# Patient Record
Sex: FEMALE | Race: ASIAN | Employment: UNEMPLOYED | URBAN - METROPOLITAN AREA
[De-identification: names, ages, dates, MRNs, and addresses within clinical notes are randomized per-mention and may not be internally consistent; named-entity substitution may affect disease eponyms.]

---
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
## 2023-03-11 PROBLEM — R55 VASOVAGAL SYNCOPE: Status: ACTIVE | Noted: 2023-03-11

## 2023-03-31 ENCOUNTER — APPOINTMENT (OUTPATIENT)
Dept: PRIMARY CARE | Facility: CLINIC | Age: 23
End: 2023-03-31
Payer: COMMERCIAL

## 2023-04-06 ENCOUNTER — OFFICE VISIT (OUTPATIENT)
Dept: PRIMARY CARE | Facility: CLINIC | Age: 23
End: 2023-04-06
Payer: COMMERCIAL

## 2023-04-06 VITALS
HEART RATE: 74 BPM | TEMPERATURE: 98.1 F | DIASTOLIC BLOOD PRESSURE: 83 MMHG | OXYGEN SATURATION: 98 % | BODY MASS INDEX: 33.78 KG/M2 | SYSTOLIC BLOOD PRESSURE: 118 MMHG | HEIGHT: 66 IN | WEIGHT: 210.2 LBS

## 2023-04-06 DIAGNOSIS — L20.82 FLEXURAL ECZEMA: ICD-10-CM

## 2023-04-06 DIAGNOSIS — Z00.00 HEALTH CARE MAINTENANCE: Primary | ICD-10-CM

## 2023-04-06 PROCEDURE — 99395 PREV VISIT EST AGE 18-39: CPT | Performed by: STUDENT IN AN ORGANIZED HEALTH CARE EDUCATION/TRAINING PROGRAM

## 2023-04-06 PROCEDURE — 99214 OFFICE O/P EST MOD 30 MIN: CPT | Performed by: STUDENT IN AN ORGANIZED HEALTH CARE EDUCATION/TRAINING PROGRAM

## 2023-04-06 PROCEDURE — 88141 CYTOPATH C/V INTERPRET: CPT | Performed by: PATHOLOGY

## 2023-04-06 PROCEDURE — 88175 CYTOPATH C/V AUTO FLUID REDO: CPT

## 2023-04-06 PROCEDURE — 1036F TOBACCO NON-USER: CPT | Performed by: STUDENT IN AN ORGANIZED HEALTH CARE EDUCATION/TRAINING PROGRAM

## 2023-04-06 RX ORDER — HYDROCORTISONE 25 MG/G
OINTMENT TOPICAL 2 TIMES DAILY PRN
Qty: 30 G | Refills: 2 | Status: SHIPPED | OUTPATIENT
Start: 2023-04-06 | End: 2023-11-04

## 2023-04-06 ASSESSMENT — PROMIS GLOBAL HEALTH SCALE
RATE_QUALITY_OF_LIFE: GOOD
RATE_SOCIAL_SATISFACTION: GOOD
RATE_MENTAL_HEALTH: GOOD
EMOTIONAL_PROBLEMS: SOMETIMES
RATE_AVERAGE_PAIN: 0
RATE_GENERAL_HEALTH: GOOD
RATE_PHYSICAL_HEALTH: GOOD
RATE_AVERAGE_FATIGUE: MODERATE
CARRYOUT_PHYSICAL_ACTIVITIES: MOSTLY
CARRYOUT_SOCIAL_ACTIVITIES: VERY GOOD

## 2023-04-06 ASSESSMENT — PAIN SCALES - GENERAL: PAINLEVEL: 0-NO PAIN

## 2023-04-06 NOTE — PROGRESS NOTES
"  Subjective   Patient ID: April is a 23 y.o. female who presents for Annual Exam (Pap).    PMH: Denies   PSH: Denies   Fam Hx: Denies family of cancers   Medications: None   Allergies: None   Social Hx: Non smoker, denies etoh or illicit drug use   OBGYN Hx:   -LMP: Last week  Not regular periods  For the last 5 months havent been regular and has spotting now and then  Has been spotting every few weeks and then 2 weeks later was having period  Wore a panty liner for spotting   Just had a full period last   Prior to the 5 months she was usually having regular periods  Denies any abdominal pain   Not on any contraception   -Last Pap: Never had a pap   -Hx of STIS: Denies   Sexual Hx: No sexual activity within the last year   SH: Hasnt been stressed in last few months  Worried that she has pcos maybe due to weight gain   Hasnt noticed any excessive hair growth.  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   - Patient's rating of their own health: good   - Dental Care: last prior dental visit - sees the dentist once a year in [place]   - Vision: last prior ophtho visit : wears glasses and had vision checked   - Hearing: denies recent hearing loss - No concerns   - Diet: making an attempt to eat healthier   Eats more vegetables and more salads and fruits   - Exercise: does not exercise    - Weight: concerned about gaining weight     Review of Systems  ROS negative except for above mentioned     Objective   Vitals: /83 (BP Location: Left arm, Patient Position: Sitting, BP Cuff Size: Adult)   Pulse 74   Temp 36.7 °C (98.1 °F) (Temporal)   Ht 1.676 m (5' 6\")   Wt 95.3 kg (210 lb 3.2 oz)   SpO2 98%   BMI 33.93 kg/m²    Physical Exam    General: Well developed, well nourished, alert and cooperative, appears to be in no acute distress.   HEENT: Normocephalic, atraumatic. EOMI. No nasal discharge. Neck supple and symmetric.  Cardiac: Normal S1 " and S2. No S3, S4, or murmurs. Rhythm is regular. No peripheral edema, cyanosis, or pallor. Extremities warm and well perfused.   Lungs: Clear to auscultation bilaterally. No rales, rhonchi, wheezing, diminished breath sounds.   Abdomen: Bowel sounds x4. Soft, nondistended, nontender. No guarding, rebound, or masses.   : External genitalia without erythema, exudate or discharge. Vaginal vault is without discharge. Cervix is of normal color without lesion. The os is closed. There is no bleeding noted.   MSK: Adequately aligned spine. ROM intact spine and extremities. No joint erythema or tenderness. No edema. Peripheral pulses intact.   Neuro: CN II-XII grossly intact. Strength and sensation symmetric and intact throughout..   Skin: Skin rash consistent with eczema noted on bilateral upper extremities   Psych: Oriented to person, place, and time. Demonstrates good judgement and reason.     Assessment/Plan   A 23 y o F with no sig pmh presents to establish care with new physician and for a health maintenance visit.   # Routine Health Maintenance  Immunization History   Administered Date(s) Administered    DTaP 2001, 2001, 2001, 04/02/2002    Hep B, Adolescent or Pediatric 2001, 2001, 04/02/2002, 07/24/2019    Hep B, adult 02/23/2021    HiB, unspecified 2001, 2001, 04/02/2002    IPV 2001, 2001, 07/18/2002    Influenza, seasonal, injectable 10/15/2021    MMR 01/31/2002    Meningococcal MCV4P 07/18/2019    Meningococcal, Unknown Serogroups 05/31/2018    Moderna SARS-CoV-2 Vaccination 01/30/2021, 02/28/2021, 11/05/2021    Pneumococcal, Unspecified 2001, 2001, 2001, 04/02/2002    Tdap 07/19/2013    Varicella 01/31/2002, 07/24/2019      - Flu vaccine: recommended annually,   - COVID vaccine: recommended completion of primary series and recommended boosters,   - Tdap: endorses having one within the last 10 years   - Lifetime HIV, syph, HepC: Denies  screening today   - Lipid Panel (35M,45F): not indicated  - DM screening: not indicated   - HTN screening: wnl today  - Depression: PHQ-2 negative  - Last Dental: recommended follow up,   - Last Eye exam: recommended follow up,   - pap smear(21-65): performed today     #Abnormal uterine bleeding  Encouraged patient to start menstrual diary and bring to next visit    #Eczema  Start Hydrocortisone daily       Problem List Items Addressed This Visit    None      Attending Supervision: Patient seen and discussed with attending physician (cosigner listed on this note).    RTC in 3 months, or earlier as needed.    Nnamdi De Luna MD  Family Medicine, PGY-2

## 2023-04-07 ENCOUNTER — APPOINTMENT (OUTPATIENT)
Dept: LAB | Facility: LAB | Age: 23
End: 2023-04-07
Payer: COMMERCIAL

## 2023-04-09 LAB
NIL(NEG) CONTROL SPOT COUNT: NORMAL
PANEL A SPOT COUNT: 0
PANEL B SPOT COUNT: 0
POS CONTROL SPOT COUNT: NORMAL
T-SPOT. TB INTERPRETATION: NEGATIVE

## 2023-04-10 NOTE — PROGRESS NOTES
I saw and evaluated the patient. I personally obtained the key and critical portions of the history and physical exam or was physically present for key and critical portions performed by the resident/fellow. I reviewed the resident/fellow's documentation and discussed the patient with the resident/fellow. I agree with the resident/fellow's medical decision making as documented in the note.    Jelani Price MD

## 2023-04-12 LAB
COMPLETE PATHOLOGY REPORT: NORMAL
CONVERTED CLINICAL DIAGNOSIS-HISTORY: NORMAL
CONVERTED DIAGNOSIS COMMENT: NORMAL
CONVERTED FINAL DIAGNOSIS: NORMAL
CONVERTED FINAL REPORT PDF LINK TO COPY AND PASTE: NORMAL

## 2023-08-18 ENCOUNTER — APPOINTMENT (OUTPATIENT)
Dept: LAB | Facility: LAB | Age: 23
End: 2023-08-18
Payer: COMMERCIAL

## 2023-11-01 ENCOUNTER — APPOINTMENT (OUTPATIENT)
Dept: PRIMARY CARE | Facility: CLINIC | Age: 23
End: 2023-11-01
Payer: COMMERCIAL

## 2023-11-01 ASSESSMENT — LIFESTYLE VARIABLES: HISTORY_OF_SMOKING: I HAVE NEVER SMOKED

## 2023-11-02 ENCOUNTER — APPOINTMENT (OUTPATIENT)
Dept: RADIOLOGY | Facility: HOSPITAL | Age: 23
End: 2023-11-02
Payer: COMMERCIAL

## 2023-11-02 ENCOUNTER — HOSPITAL ENCOUNTER (EMERGENCY)
Facility: HOSPITAL | Age: 23
Discharge: HOME | End: 2023-11-02
Payer: COMMERCIAL

## 2023-11-02 VITALS
DIASTOLIC BLOOD PRESSURE: 84 MMHG | TEMPERATURE: 97.4 F | RESPIRATION RATE: 16 BRPM | OXYGEN SATURATION: 99 % | SYSTOLIC BLOOD PRESSURE: 128 MMHG | HEART RATE: 67 BPM

## 2023-11-02 DIAGNOSIS — R51.9 ACUTE NONINTRACTABLE HEADACHE, UNSPECIFIED HEADACHE TYPE: Primary | ICD-10-CM

## 2023-11-02 LAB
ALBUMIN SERPL BCP-MCNC: 4.6 G/DL (ref 3.4–5)
ALP SERPL-CCNC: 53 U/L (ref 33–110)
ALT SERPL W P-5'-P-CCNC: 14 U/L (ref 7–45)
ANION GAP SERPL CALC-SCNC: 14 MMOL/L (ref 10–20)
APPEARANCE UR: CLEAR
AST SERPL W P-5'-P-CCNC: 22 U/L (ref 9–39)
BASOPHILS # BLD AUTO: 0.01 X10*3/UL (ref 0–0.1)
BASOPHILS NFR BLD AUTO: 0.2 %
BILIRUB SERPL-MCNC: 0.5 MG/DL (ref 0–1.2)
BILIRUB UR STRIP.AUTO-MCNC: NEGATIVE MG/DL
BUN SERPL-MCNC: 4 MG/DL (ref 6–23)
CALCIUM SERPL-MCNC: 9.8 MG/DL (ref 8.6–10.6)
CHLORIDE SERPL-SCNC: 106 MMOL/L (ref 98–107)
CO2 SERPL-SCNC: 23 MMOL/L (ref 21–32)
COLOR UR: YELLOW
CREAT SERPL-MCNC: 0.64 MG/DL (ref 0.5–1.05)
EOSINOPHIL # BLD AUTO: 0.01 X10*3/UL (ref 0–0.7)
EOSINOPHIL NFR BLD AUTO: 0.2 %
ERYTHROCYTE [DISTWIDTH] IN BLOOD BY AUTOMATED COUNT: 12 % (ref 11.5–14.5)
GFR SERPL CREATININE-BSD FRML MDRD: >90 ML/MIN/1.73M*2
GLUCOSE SERPL-MCNC: 84 MG/DL (ref 74–99)
GLUCOSE UR STRIP.AUTO-MCNC: NEGATIVE MG/DL
HCT VFR BLD AUTO: 40 % (ref 36–46)
HGB BLD-MCNC: 14.1 G/DL (ref 12–16)
HOLD SPECIMEN: NORMAL
IMM GRANULOCYTES # BLD AUTO: 0 X10*3/UL (ref 0–0.7)
IMM GRANULOCYTES NFR BLD AUTO: 0 % (ref 0–0.9)
KETONES UR STRIP.AUTO-MCNC: NEGATIVE MG/DL
LEUKOCYTE ESTERASE UR QL STRIP.AUTO: NEGATIVE
LYMPHOCYTES # BLD AUTO: 1.56 X10*3/UL (ref 1.2–4.8)
LYMPHOCYTES NFR BLD AUTO: 29.4 %
MAGNESIUM SERPL-MCNC: 2.05 MG/DL (ref 1.6–2.4)
MCH RBC QN AUTO: 29 PG (ref 26–34)
MCHC RBC AUTO-ENTMCNC: 35.3 G/DL (ref 32–36)
MCV RBC AUTO: 82 FL (ref 80–100)
MONOCYTES # BLD AUTO: 0.69 X10*3/UL (ref 0.1–1)
MONOCYTES NFR BLD AUTO: 13 %
NEUTROPHILS # BLD AUTO: 3.04 X10*3/UL (ref 1.2–7.7)
NEUTROPHILS NFR BLD AUTO: 57.2 %
NITRITE UR QL STRIP.AUTO: NEGATIVE
NRBC BLD-RTO: 0 /100 WBCS (ref 0–0)
PH UR STRIP.AUTO: 6 [PH]
PLATELET # BLD AUTO: 216 X10*3/UL (ref 150–450)
POTASSIUM SERPL-SCNC: 4.4 MMOL/L (ref 3.5–5.3)
PREGNANCY TEST URINE, POC: NEGATIVE
PROT SERPL-MCNC: 8 G/DL (ref 6.4–8.2)
PROT UR STRIP.AUTO-MCNC: NEGATIVE MG/DL
RBC # BLD AUTO: 4.86 X10*6/UL (ref 4–5.2)
RBC # UR STRIP.AUTO: NEGATIVE /UL
SODIUM SERPL-SCNC: 139 MMOL/L (ref 136–145)
SP GR UR STRIP.AUTO: 1.01
UROBILINOGEN UR STRIP.AUTO-MCNC: <2 MG/DL
WBC # BLD AUTO: 5.3 X10*3/UL (ref 4.4–11.3)

## 2023-11-02 PROCEDURE — 70450 CT HEAD/BRAIN W/O DYE: CPT

## 2023-11-02 PROCEDURE — 81025 URINE PREGNANCY TEST: CPT

## 2023-11-02 PROCEDURE — 81003 URINALYSIS AUTO W/O SCOPE: CPT

## 2023-11-02 PROCEDURE — 96374 THER/PROPH/DIAG INJ IV PUSH: CPT

## 2023-11-02 PROCEDURE — 85025 COMPLETE CBC W/AUTO DIFF WBC: CPT

## 2023-11-02 PROCEDURE — 70450 CT HEAD/BRAIN W/O DYE: CPT | Performed by: RADIOLOGY

## 2023-11-02 PROCEDURE — 80053 COMPREHEN METABOLIC PANEL: CPT

## 2023-11-02 PROCEDURE — 96375 TX/PRO/DX INJ NEW DRUG ADDON: CPT

## 2023-11-02 PROCEDURE — 2500000004 HC RX 250 GENERAL PHARMACY W/ HCPCS (ALT 636 FOR OP/ED)

## 2023-11-02 PROCEDURE — 96361 HYDRATE IV INFUSION ADD-ON: CPT

## 2023-11-02 PROCEDURE — 36415 COLL VENOUS BLD VENIPUNCTURE: CPT

## 2023-11-02 PROCEDURE — 99284 EMERGENCY DEPT VISIT MOD MDM: CPT

## 2023-11-02 PROCEDURE — 99284 EMERGENCY DEPT VISIT MOD MDM: CPT | Mod: 25

## 2023-11-02 PROCEDURE — 83735 ASSAY OF MAGNESIUM: CPT

## 2023-11-02 RX ORDER — ACETAMINOPHEN 325 MG/1
650 TABLET ORAL EVERY 6 HOURS PRN
Qty: 20 TABLET | Refills: 0 | Status: SHIPPED | OUTPATIENT
Start: 2023-11-02 | End: 2023-11-04 | Stop reason: SDUPTHER

## 2023-11-02 RX ORDER — METOCLOPRAMIDE 10 MG/1
10 TABLET ORAL EVERY 6 HOURS
Qty: 20 TABLET | Refills: 0 | Status: SHIPPED | OUTPATIENT
Start: 2023-11-02 | End: 2023-11-28 | Stop reason: SDUPTHER

## 2023-11-02 RX ORDER — KETOROLAC TROMETHAMINE 15 MG/ML
15 INJECTION, SOLUTION INTRAMUSCULAR; INTRAVENOUS ONCE
Status: COMPLETED | OUTPATIENT
Start: 2023-11-02 | End: 2023-11-02

## 2023-11-02 RX ORDER — IBUPROFEN 600 MG/1
600 TABLET ORAL EVERY 6 HOURS PRN
Qty: 16 TABLET | Refills: 0 | Status: SHIPPED | OUTPATIENT
Start: 2023-11-02 | End: 2023-11-06

## 2023-11-02 RX ORDER — DIPHENHYDRAMINE HCL 25 MG
25 TABLET ORAL EVERY 8 HOURS PRN
Qty: 15 TABLET | Refills: 0 | Status: SHIPPED | OUTPATIENT
Start: 2023-11-02 | End: 2023-11-07

## 2023-11-02 RX ORDER — METOCLOPRAMIDE HYDROCHLORIDE 5 MG/ML
10 INJECTION INTRAMUSCULAR; INTRAVENOUS ONCE
Status: COMPLETED | OUTPATIENT
Start: 2023-11-02 | End: 2023-11-02

## 2023-11-02 RX ORDER — DIPHENHYDRAMINE HYDROCHLORIDE 50 MG/ML
25 INJECTION INTRAMUSCULAR; INTRAVENOUS ONCE
Status: COMPLETED | OUTPATIENT
Start: 2023-11-02 | End: 2023-11-02

## 2023-11-02 RX ADMIN — METOCLOPRAMIDE 10 MG: 5 INJECTION, SOLUTION INTRAMUSCULAR; INTRAVENOUS at 11:54

## 2023-11-02 RX ADMIN — KETOROLAC TROMETHAMINE 15 MG: 15 INJECTION, SOLUTION INTRAMUSCULAR; INTRAVENOUS at 11:54

## 2023-11-02 RX ADMIN — SODIUM CHLORIDE, POTASSIUM CHLORIDE, SODIUM LACTATE AND CALCIUM CHLORIDE 1000 ML: 600; 310; 30; 20 INJECTION, SOLUTION INTRAVENOUS at 11:54

## 2023-11-02 RX ADMIN — DIPHENHYDRAMINE HYDROCHLORIDE 25 MG: 50 INJECTION INTRAMUSCULAR; INTRAVENOUS at 11:54

## 2023-11-02 ASSESSMENT — COLUMBIA-SUICIDE SEVERITY RATING SCALE - C-SSRS
2. HAVE YOU ACTUALLY HAD ANY THOUGHTS OF KILLING YOURSELF?: NO
1. IN THE PAST MONTH, HAVE YOU WISHED YOU WERE DEAD OR WISHED YOU COULD GO TO SLEEP AND NOT WAKE UP?: NO
6. HAVE YOU EVER DONE ANYTHING, STARTED TO DO ANYTHING, OR PREPARED TO DO ANYTHING TO END YOUR LIFE?: NO

## 2023-11-02 ASSESSMENT — VISUAL ACUITY: OU: 1

## 2023-11-02 NOTE — ED PROVIDER NOTES
HPI   Chief Complaint   Patient presents with   • Migraine       23-year-old female with history of migraines presents for chief complaint of headache.  Ongoing for 1 day.  States that she typically takes Excedrin, Tylenol, and pseudoephedrine, however her headache today feels different.  States that it feels worse.  Across the forehead and also at the base of the skull posteriorly.  Denies injury.  Was seen at urgent care today, and sent here for further work-up.  They tested her for COVID and influenza and the tests were negative, per patient.  Endorses that her symptoms initially started a few days ago with a sore throat and some loose stools.  However her biggest symptom currently is the headache.  Endorses some photophobia but denies vision changes.  She wears glasses.  Endorses a fever the other day but currently afebrile.  Pain worse on movement.  Denies cough or congestion.  Denies vomiting but she does endorse some nausea.  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  Denies IV drug use.  Denies history of cancer.                          No data recorded                Patient History   No past medical history on file.  No past surgical history on file.  No family history on file.  Social History     Tobacco Use   • Smoking status: Never   • Smokeless tobacco: Never   Substance Use Topics   • Alcohol use: Never   • Drug use: Never       Physical Exam   ED Triage Vitals [11/02/23 1041]   Temp Heart Rate Resp BP   36.3 °C (97.4 °F) 67 16 128/84      SpO2 Temp Source Heart Rate Source Patient Position   99 % Temporal -- --      BP Location FiO2 (%)     -- --       Physical Exam  Vitals and nursing note reviewed.   Constitutional:       General: She is not in acute distress.     Appearance: She is well-developed.   HENT:      Head: Normocephalic and atraumatic.   Eyes:      General: Vision grossly intact. Gaze aligned appropriately.       Extraocular Movements: Extraocular movements intact.      Conjunctiva/sclera: Conjunctivae normal.      Comments: PERRLA   Neck:      Thyroid: No thyroid mass.      Trachea: Trachea and phonation normal.      Meningeal: Brudzinski's sign absent.      Comments: No midline C, T, L-spine tenderness, crepitus, step-off, or deformity.  Cardiovascular:      Rate and Rhythm: Normal rate and regular rhythm.      Heart sounds: No murmur heard.  Pulmonary:      Effort: Pulmonary effort is normal. No respiratory distress.      Breath sounds: Normal breath sounds.   Abdominal:      Palpations: Abdomen is soft.      Tenderness: There is no abdominal tenderness.   Musculoskeletal:         General: No swelling.      Cervical back: Full passive range of motion without pain, normal range of motion and neck supple. No edema, erythema, signs of trauma, rigidity, torticollis or crepitus. No pain with movement, spinous process tenderness or muscular tenderness. Normal range of motion.   Lymphadenopathy:      Cervical: No cervical adenopathy.   Skin:     General: Skin is warm and dry.      Capillary Refill: Capillary refill takes less than 2 seconds.   Neurological:      Mental Status: She is alert and oriented to person, place, and time.      GCS: GCS eye subscore is 4. GCS verbal subscore is 5. GCS motor subscore is 6.      Cranial Nerves: Cranial nerves 2-12 are intact.      Sensory: Sensation is intact.      Motor: Motor function is intact.      Coordination: Coordination is intact.      Gait: Gait is intact.      Comments: No focal deficits.  No nuchal signs.  No nuchal rigidity.    Psychiatric:         Mood and Affect: Mood normal.         ED Course & MDM   Diagnoses as of 11/02/23 1331   Acute nonintractable headache, unspecified headache type       Medical Decision Making  Vital signs reviewed, unremarkable at this time.  Patient is afebrile.  Not tachycardic.  She is well-appearing and in no apparent distress.  Speaks in full  sentences without difficulty.  Diagnostic testing performed.  Reglan, Benadryl, Toradol, and LR given for symptom management.  Due to the patient's change in headache type and severity, CT head was performed to rule out hemorrhage, tumor, abscess.  I do not suspect meningitis.  No meningeal signs on exam.  Afebrile.  Appears nontoxic.  CMP, CBC with differential all within normal ranges and unremarkable.  Magnesium within normal ranges and unremarkable.  Urinalysis shows no evidence of infection or blood.  Pregnancy test negative.  CT head without contrast shows no acute intracranial hemorrhage or other acute intracranial abnormality.  On reevaluation the patient endorses feeling much improved.  States that she feels comfortable to go home now at this point.  Will be given Reglan, Benadryl, Tylenol, and ibuprofen.  Encouraged to only take as directed.  We discussed the importance of hydration and sleep as well as decrease screen time appropriate posture, and appropriate caffeine intake.  Advised to return with any new or worsening symptoms and to follow-up with primary care.  Work note given.  Patient in agreement with this plan.  Discharged in stable condition.  No concern for meningitis at this point.        Procedure  Procedures     Horace Man, PAOLA-CNP  11/02/23 9301

## 2023-11-02 NOTE — Clinical Note
April Quiroz was seen and treated in our emergency department on 11/2/2023.  She may return to work on 11/05/2023.       If you have any questions or concerns, please don't hesitate to call.      Horace Man, APRN-CNP

## 2023-11-04 ENCOUNTER — HOSPITAL ENCOUNTER (EMERGENCY)
Facility: HOSPITAL | Age: 23
Discharge: HOME | End: 2023-11-04
Attending: EMERGENCY MEDICINE
Payer: COMMERCIAL

## 2023-11-04 ENCOUNTER — TELEMEDICINE (OUTPATIENT)
Dept: PRIMARY CARE | Facility: CLINIC | Age: 23
End: 2023-11-04
Payer: COMMERCIAL

## 2023-11-04 VITALS
HEIGHT: 65 IN | BODY MASS INDEX: 29.99 KG/M2 | OXYGEN SATURATION: 96 % | RESPIRATION RATE: 16 BRPM | TEMPERATURE: 96.1 F | SYSTOLIC BLOOD PRESSURE: 121 MMHG | HEART RATE: 71 BPM | WEIGHT: 180 LBS | DIASTOLIC BLOOD PRESSURE: 83 MMHG

## 2023-11-04 DIAGNOSIS — R51.9 ACUTE INTRACTABLE HEADACHE, UNSPECIFIED HEADACHE TYPE: Primary | ICD-10-CM

## 2023-11-04 DIAGNOSIS — G43.801 OTHER MIGRAINE WITH STATUS MIGRAINOSUS, NOT INTRACTABLE: Primary | ICD-10-CM

## 2023-11-04 DIAGNOSIS — R51.9 ACUTE NONINTRACTABLE HEADACHE, UNSPECIFIED HEADACHE TYPE: ICD-10-CM

## 2023-11-04 LAB
ALBUMIN SERPL BCP-MCNC: 4.6 G/DL (ref 3.4–5)
ALP SERPL-CCNC: 50 U/L (ref 33–110)
ALT SERPL W P-5'-P-CCNC: 15 U/L (ref 7–45)
ANION GAP SERPL CALC-SCNC: 14 MMOL/L (ref 10–20)
AST SERPL W P-5'-P-CCNC: 17 U/L (ref 9–39)
BASOPHILS # BLD AUTO: 0.03 X10*3/UL (ref 0–0.1)
BASOPHILS NFR BLD AUTO: 0.4 %
BILIRUB SERPL-MCNC: 0.5 MG/DL (ref 0–1.2)
BUN SERPL-MCNC: 7 MG/DL (ref 6–23)
CALCIUM SERPL-MCNC: 10 MG/DL (ref 8.6–10.6)
CHLORIDE SERPL-SCNC: 101 MMOL/L (ref 98–107)
CO2 SERPL-SCNC: 25 MMOL/L (ref 21–32)
CREAT SERPL-MCNC: 0.72 MG/DL (ref 0.5–1.05)
EOSINOPHIL # BLD AUTO: 0.1 X10*3/UL (ref 0–0.7)
EOSINOPHIL NFR BLD AUTO: 1.4 %
ERYTHROCYTE [DISTWIDTH] IN BLOOD BY AUTOMATED COUNT: 11.8 % (ref 11.5–14.5)
FLUAV RNA RESP QL NAA+PROBE: NOT DETECTED
FLUBV RNA RESP QL NAA+PROBE: NOT DETECTED
GFR SERPL CREATININE-BSD FRML MDRD: >90 ML/MIN/1.73M*2
GLUCOSE SERPL-MCNC: 85 MG/DL (ref 74–99)
HCT VFR BLD AUTO: 41.5 % (ref 36–46)
HGB BLD-MCNC: 14.1 G/DL (ref 12–16)
IMM GRANULOCYTES # BLD AUTO: 0.01 X10*3/UL (ref 0–0.7)
IMM GRANULOCYTES NFR BLD AUTO: 0.1 % (ref 0–0.9)
LYMPHOCYTES # BLD AUTO: 2.7 X10*3/UL (ref 1.2–4.8)
LYMPHOCYTES NFR BLD AUTO: 37.2 %
MCH RBC QN AUTO: 29.5 PG (ref 26–34)
MCHC RBC AUTO-ENTMCNC: 34 G/DL (ref 32–36)
MCV RBC AUTO: 87 FL (ref 80–100)
MONOCYTES # BLD AUTO: 0.5 X10*3/UL (ref 0.1–1)
MONOCYTES NFR BLD AUTO: 6.9 %
NEUTROPHILS # BLD AUTO: 3.92 X10*3/UL (ref 1.2–7.7)
NEUTROPHILS NFR BLD AUTO: 54 %
NRBC BLD-RTO: 0 /100 WBCS (ref 0–0)
PLATELET # BLD AUTO: 252 X10*3/UL (ref 150–450)
POTASSIUM SERPL-SCNC: 3.5 MMOL/L (ref 3.5–5.3)
PROT SERPL-MCNC: 7.8 G/DL (ref 6.4–8.2)
RBC # BLD AUTO: 4.78 X10*6/UL (ref 4–5.2)
SARS-COV-2 RNA RESP QL NAA+PROBE: NOT DETECTED
SODIUM SERPL-SCNC: 136 MMOL/L (ref 136–145)
WBC # BLD AUTO: 7.3 X10*3/UL (ref 4.4–11.3)

## 2023-11-04 PROCEDURE — 99214 OFFICE O/P EST MOD 30 MIN: CPT | Performed by: NURSE PRACTITIONER

## 2023-11-04 PROCEDURE — 85025 COMPLETE CBC W/AUTO DIFF WBC: CPT | Performed by: STUDENT IN AN ORGANIZED HEALTH CARE EDUCATION/TRAINING PROGRAM

## 2023-11-04 PROCEDURE — 99284 EMERGENCY DEPT VISIT MOD MDM: CPT | Performed by: EMERGENCY MEDICINE

## 2023-11-04 PROCEDURE — 99284 EMERGENCY DEPT VISIT MOD MDM: CPT | Mod: 25 | Performed by: EMERGENCY MEDICINE

## 2023-11-04 PROCEDURE — 87502 INFLUENZA DNA AMP PROBE: CPT | Performed by: EMERGENCY MEDICINE

## 2023-11-04 PROCEDURE — 2500000001 HC RX 250 WO HCPCS SELF ADMINISTERED DRUGS (ALT 637 FOR MEDICARE OP): Performed by: STUDENT IN AN ORGANIZED HEALTH CARE EDUCATION/TRAINING PROGRAM

## 2023-11-04 PROCEDURE — 2500000004 HC RX 250 GENERAL PHARMACY W/ HCPCS (ALT 636 FOR OP/ED): Performed by: STUDENT IN AN ORGANIZED HEALTH CARE EDUCATION/TRAINING PROGRAM

## 2023-11-04 PROCEDURE — 36415 COLL VENOUS BLD VENIPUNCTURE: CPT | Performed by: STUDENT IN AN ORGANIZED HEALTH CARE EDUCATION/TRAINING PROGRAM

## 2023-11-04 PROCEDURE — 87040 BLOOD CULTURE FOR BACTERIA: CPT | Performed by: STUDENT IN AN ORGANIZED HEALTH CARE EDUCATION/TRAINING PROGRAM

## 2023-11-04 PROCEDURE — 96372 THER/PROPH/DIAG INJ SC/IM: CPT

## 2023-11-04 PROCEDURE — 96361 HYDRATE IV INFUSION ADD-ON: CPT

## 2023-11-04 PROCEDURE — 96374 THER/PROPH/DIAG INJ IV PUSH: CPT

## 2023-11-04 PROCEDURE — 80053 COMPREHEN METABOLIC PANEL: CPT | Performed by: STUDENT IN AN ORGANIZED HEALTH CARE EDUCATION/TRAINING PROGRAM

## 2023-11-04 RX ORDER — KETOROLAC TROMETHAMINE 30 MG/ML
30 INJECTION, SOLUTION INTRAMUSCULAR; INTRAVENOUS ONCE
Status: COMPLETED | OUTPATIENT
Start: 2023-11-04 | End: 2023-11-04

## 2023-11-04 RX ORDER — METOCLOPRAMIDE HYDROCHLORIDE 5 MG/ML
10 INJECTION INTRAMUSCULAR; INTRAVENOUS ONCE
Status: COMPLETED | OUTPATIENT
Start: 2023-11-04 | End: 2023-11-04

## 2023-11-04 RX ORDER — ACETAMINOPHEN 325 MG/1
1000 TABLET ORAL EVERY 6 HOURS PRN
Qty: 20 TABLET | Refills: 0 | Status: SHIPPED | OUTPATIENT
Start: 2023-11-04 | End: 2023-11-09

## 2023-11-04 RX ORDER — DIPHENHYDRAMINE HCL 25 MG
25 CAPSULE ORAL ONCE
Status: COMPLETED | OUTPATIENT
Start: 2023-11-04 | End: 2023-11-04

## 2023-11-04 RX ORDER — ACETAMINOPHEN 325 MG/1
975 TABLET ORAL ONCE
Status: COMPLETED | OUTPATIENT
Start: 2023-11-04 | End: 2023-11-04

## 2023-11-04 RX ADMIN — METOCLOPRAMIDE 10 MG: 5 INJECTION, SOLUTION INTRAMUSCULAR; INTRAVENOUS at 21:19

## 2023-11-04 RX ADMIN — ACETAMINOPHEN 975 MG: 325 TABLET ORAL at 21:20

## 2023-11-04 RX ADMIN — SODIUM CHLORIDE, POTASSIUM CHLORIDE, SODIUM LACTATE AND CALCIUM CHLORIDE 1000 ML: 600; 310; 30; 20 INJECTION, SOLUTION INTRAVENOUS at 21:20

## 2023-11-04 RX ADMIN — DIPHENHYDRAMINE HYDROCHLORIDE 25 MG: 25 CAPSULE ORAL at 21:20

## 2023-11-04 RX ADMIN — KETOROLAC TROMETHAMINE 30 MG: 30 INJECTION, SOLUTION INTRAMUSCULAR; INTRAVENOUS at 21:19

## 2023-11-04 ASSESSMENT — LIFESTYLE VARIABLES
HAVE PEOPLE ANNOYED YOU BY CRITICIZING YOUR DRINKING: NO
EVER HAD A DRINK FIRST THING IN THE MORNING TO STEADY YOUR NERVES TO GET RID OF A HANGOVER: NO
EVER FELT BAD OR GUILTY ABOUT YOUR DRINKING: NO
REASON UNABLE TO ASSESS: YES
HAVE YOU EVER FELT YOU SHOULD CUT DOWN ON YOUR DRINKING: NO

## 2023-11-04 ASSESSMENT — ENCOUNTER SYMPTOMS
NAUSEA: 0
RHINORRHEA: 0
VOMITING: 0
DYSURIA: 0
FEVER: 1
SORE THROAT: 0
MYALGIAS: 0
HEADACHES: 1
COUGH: 0
CHILLS: 1
ARTHRALGIAS: 0

## 2023-11-04 ASSESSMENT — COLUMBIA-SUICIDE SEVERITY RATING SCALE - C-SSRS
1. IN THE PAST MONTH, HAVE YOU WISHED YOU WERE DEAD OR WISHED YOU COULD GO TO SLEEP AND NOT WAKE UP?: NO
2. HAVE YOU ACTUALLY HAD ANY THOUGHTS OF KILLING YOURSELF?: NO
6. HAVE YOU EVER DONE ANYTHING, STARTED TO DO ANYTHING, OR PREPARED TO DO ANYTHING TO END YOUR LIFE?: NO

## 2023-11-04 ASSESSMENT — PAIN SCALES - GENERAL
PAINLEVEL_OUTOF10: 5 - MODERATE PAIN
PAINLEVEL_OUTOF10: 5 - MODERATE PAIN

## 2023-11-04 ASSESSMENT — PAIN - FUNCTIONAL ASSESSMENT: PAIN_FUNCTIONAL_ASSESSMENT: 0-10

## 2023-11-04 NOTE — ED TRIAGE NOTES
Pt to ED c/o flu like symptoms. Pt was here for same complaint two days ago & was discharged with a prescription for tylenol, motrin but states it is not helping with her pain. Pt temp in triage 102.0 Pt endorsing a headache.

## 2023-11-04 NOTE — PROGRESS NOTES
Subjective   Patient ID: April Quiroz is a 23 y.o. female who presents for Migraine.  Seen in ED 11/2 for headache   Began with some GI sx, ST.   Not febrile in ED - labs and CT unremarkable.  T last night 101 - 102.4 this morning fever remains consistent, frontal headache, not using NSAIDs or APAP regularly.  Does not endorse any photophobia, URI sx, cough, etc.  No diagnosed history of migraines- but reports some relationship to periods at times. 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      Migraine   Associated symptoms include a fever. Pertinent negatives include no coughing, nausea, rhinorrhea, sore throat or vomiting.       Review of Systems   Constitutional:  Positive for chills and fever.   HENT:  Negative for congestion, rhinorrhea and sore throat.    Respiratory:  Negative for cough.    Gastrointestinal:  Negative for nausea and vomiting.   Genitourinary:  Negative for dysuria.   Musculoskeletal:  Negative for arthralgias and myalgias.   Neurological:  Positive for headaches.       Objective   Physical Exam  Constitutional:       Appearance: Normal appearance. She is not ill-appearing or toxic-appearing.   HENT:      Head: Normocephalic and atraumatic.   Eyes:      Extraocular Movements: Extraocular movements intact.      Comments: Observed, no photophobia   Neck:      Comments: observed  Pulmonary:      Effort: Pulmonary effort is normal.   Musculoskeletal:      Cervical back: Normal range of motion.   Neurological:      General: No focal deficit present.      Mental Status: She is alert.      Comments: None evident based on limited observation.   Psychiatric:         Mood and Affect: Mood normal.      Comments: Affect bright, talkative         Assessment/Plan

## 2023-11-04 NOTE — ASSESSMENT & PLAN NOTE
Complete workup WNL including CT.   Began with some viral sx, could still well be viral in nature or r/t COVID that has not been detected d/t early testing.    No URI sx currently, frontal headache only.  Exam unremarkable, ED records reviewed.  Recommend follow instructions carefully, lots of fluids, round the clock analgesia x 24 hours, no screens, etc and if sx persist ( or worsen/new) would need to be seen again in ED.  Differential includes viral illness with HA as prominent feature, COVID, tension headache given location with viral illness, or other worsening illness that was not reflected in original labs.  Agrees with plan.

## 2023-11-05 ENCOUNTER — PHARMACY VISIT (OUTPATIENT)
Dept: PHARMACY | Facility: CLINIC | Age: 23
End: 2023-11-05

## 2023-11-05 NOTE — ED PROVIDER NOTES
HPI: The patient is a 23-year-old female with past medical history of migraines, who presents to the emergency department with concern for headache and fever.  Patient states that she was recently seen in the emergency department for a headache, at that time it was severe, unlike her prior migraines.  At that time, she did have a CT scan of her head which was negative for any acute processes. She also had labs done at that time showing no leukocytosis and metabolic panel was unremarkable. She was given a migraine cocktail and felt significantly improved and was discharged home.  She states that yesterday, she developed a fever, and has a waxing and waning frontal headache.  She states the character of her headache is currently similar to her migraine, not as severe as a few days ago. The headache was gradual in onset, not the worst headache of her life. She denies any neck pain or neck stiffness.  However she does endorse photophobia which she often has with her migraines.  She also endorses nausea without any vomiting.  She reports that she has been taking 650 mg of Tylenol occasionally as well as 600 mg of ibuprofen.  States that she had a virtual appointment with her primary team earlier today and when she describes a fever, she was recommended to come to the emergency department.  Denies any recent trauma, vision changes, abnormal lethargy. No vision changes, chest pain, shortness of breath, vomiting, abdominal pain, urinary symptoms, numbness, or tingling.     Limitations/ Additional hx obtained via: No limitations, additional history obtained by reviewing chart, prior ED visit.    ED Triage Vitals   Temp Heart Rate Resp BP   11/04/23 1920 11/04/23 1920 11/04/23 1920 11/04/23 1920   38.9 °C (102 °F) 90 18 (!) 152/100      SpO2 Temp Source Heart Rate Source Patient Position   11/04/23 1920 11/04/23 2309 11/04/23 2309 11/04/23 2309   97 % Temporal Monitor Sitting      BP Location FiO2 (%)     -- --                Physical Examination  Vital signs reviewed in nursing triage note, on EMR flow sheets, and at bedside.  GEN: Well-developed, well nourished, in no apparent distress.  Comfortably sitting in a chair.  SKIN: Warm, dry, intact. No gross rashes or lesions.   EYES: Pupils equal, round. EOM intact. Conjunctiva clear.   HENT: Normocephalic, atraumatic, mucous membranes moist.   NECK: Supple, trachea midline.  No meningismus. Full range of motion intact.  CV: Regular rate rhythm.  PULM: Breathing nonlabored on room air, speaks in full sentences.  To auscultation bilaterally  GI: Abd soft, nontender, nondistended.   EXT: Symmetric muscle bulk, moves all equally. No joint swelling, no pedal edema.   NEURO: Alert, did to self time and place.  Following commands, CN II-XII intact, no focal deficits. Ambulates with normal gait.  Upper and lower extremity strength 5/5, equal and intact.  Sensation intact.  PSYCH: Answers questions appropriately with congruent affect.    MDM  23-year-old female with past medical history of migraines presents to the emergency department complaining of headache and fever.  Vital signs on arrival do show a temperature of 38.9, remaining vital signs are unremarkable. The patient is in no respiratory distress, satting well on room air. Exam is as above, she is neurologically intact, no meningismus. She has no neck pain or neck stiffness. She has full ROM of her neck. Her headache is frontal in location. She is sitting comfortably in the chair.  She has no focal deficits.  Chart was reviewed from prior visit showing negative CT head, labs which were unremarkable, no leukocytosis.  Discussed the differential including meningitis with the patient. 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 We will administer migraine cocktail with plan to reevaluate and pursue LP with shared decision making afterwards.  Patient  was given fluids, IV antiemetics, Benadryl, and Toradol.  On reevaluation, patient states she feels significantly improved, only has a mild migraine present.  Denies any current photophobia. She continues to deny any neck pain or neck stiffness. Labwork shows no leukocytosis. Metabolic panel is again unremarkable, COVID swab and flu swabs were obtained and negative.  Vital signs repeated, her fever has defervesced. Shared decision-making was had with the patient who decided not to undergo LP at this time however I did discuss very strict return precautions with the patient to which she is agreeable.  She remains nontoxic, has no significant leukocytosis again is able to move her neck, I feel comfortable with this shared decision-making plan, appears to be reliable and will follow-up closely outpatient. The patient was informed of the results. The patient felt comfortable being discharged home. The patient was instructed of supportive measures and to follow-up with a primary care physician. Return precautions were provided, for which the patient expressed understanding. The patient was discharged home in stable condition. They should feel free to return to the Emergency Department at any time should their condition worsen or should they have any questions or concerns.     Diagnoses as of 11/04/23 2343   Other migraine with status migrainosus, not intractable        Procedures      Escalations of care considered by not given: lumbar puncture    Disposition  - Discharged    Discussed with attending physician, Dr. Samantha Hernandez DO  EM PGY3       Heather Hernandez DO  Resident  11/05/23 3117       Lyssa Singh MD  11/05/23 6297

## 2023-11-05 NOTE — DISCHARGE INSTRUCTIONS
Patient please return to the emergency department for any worsening or persistent symptoms including recurrent headache specially if this is associated with fevers, neck pain, persistent nausea or sensitivity to light.  Please make sure that you follow-up with your primary care doctor within the next week.

## 2023-11-08 LAB — BACTERIA BLD CULT: NORMAL

## 2023-11-28 ENCOUNTER — OFFICE VISIT (OUTPATIENT)
Dept: PRIMARY CARE | Facility: CLINIC | Age: 23
End: 2023-11-28
Payer: COMMERCIAL

## 2023-11-28 ENCOUNTER — PHARMACY VISIT (OUTPATIENT)
Dept: PHARMACY | Facility: CLINIC | Age: 23
End: 2023-11-28
Payer: COMMERCIAL

## 2023-11-28 VITALS
HEART RATE: 68 BPM | WEIGHT: 206 LBS | HEIGHT: 66 IN | OXYGEN SATURATION: 98 % | BODY MASS INDEX: 33.11 KG/M2 | DIASTOLIC BLOOD PRESSURE: 68 MMHG | SYSTOLIC BLOOD PRESSURE: 112 MMHG

## 2023-11-28 DIAGNOSIS — Z00.00 HEALTH CARE MAINTENANCE: Primary | ICD-10-CM

## 2023-11-28 DIAGNOSIS — R51.9 ACUTE NONINTRACTABLE HEADACHE, UNSPECIFIED HEADACHE TYPE: ICD-10-CM

## 2023-11-28 DIAGNOSIS — R11.0 NAUSEA: ICD-10-CM

## 2023-11-28 DIAGNOSIS — L98.9 SKIN LESION: ICD-10-CM

## 2023-11-28 PROBLEM — G43.909 MIGRAINES: Status: ACTIVE | Noted: 2023-11-01

## 2023-11-28 PROCEDURE — RXMED WILLOW AMBULATORY MEDICATION CHARGE

## 2023-11-28 PROCEDURE — 99204 OFFICE O/P NEW MOD 45 MIN: CPT | Performed by: STUDENT IN AN ORGANIZED HEALTH CARE EDUCATION/TRAINING PROGRAM

## 2023-11-28 PROCEDURE — 1036F TOBACCO NON-USER: CPT | Performed by: STUDENT IN AN ORGANIZED HEALTH CARE EDUCATION/TRAINING PROGRAM

## 2023-11-28 RX ORDER — METOCLOPRAMIDE 10 MG/1
10 TABLET ORAL EVERY 6 HOURS
Qty: 20 TABLET | Refills: 0 | Status: SHIPPED | OUTPATIENT
Start: 2023-11-28 | End: 2023-11-28 | Stop reason: SDUPTHER

## 2023-11-28 RX ORDER — ONDANSETRON 4 MG/1
4 TABLET, ORALLY DISINTEGRATING ORAL EVERY 8 HOURS PRN
Qty: 20 TABLET | Refills: 0 | Status: SHIPPED | OUTPATIENT
Start: 2023-11-28 | End: 2023-12-06

## 2023-11-28 RX ORDER — METOCLOPRAMIDE 10 MG/1
10 TABLET ORAL EVERY 6 HOURS
Qty: 20 TABLET | Refills: 0 | Status: SHIPPED | OUTPATIENT
Start: 2023-11-28 | End: 2023-12-04

## 2023-11-28 RX ORDER — SUMATRIPTAN SUCCINATE 25 MG/1
TABLET ORAL
COMMUNITY
Start: 2023-11-02

## 2023-11-28 RX ORDER — ONDANSETRON 4 MG/1
4 TABLET, ORALLY DISINTEGRATING ORAL EVERY 8 HOURS PRN
Qty: 20 TABLET | Refills: 0 | Status: SHIPPED | OUTPATIENT
Start: 2023-11-28 | End: 2023-11-28 | Stop reason: SDUPTHER

## 2023-11-28 NOTE — PROGRESS NOTES
Subjective   Patient ID: April Quiroz is a 23 y.o. female who presents for No chief complaint on file..  HPI  This is a 23-year-old here to establish care and follow-up after recent ER trip.  She has history of migraine headaches and does have a triptan medication at home, she is actually never taken this medication but does have a prescription for it and does have pills.  She went to the ER in early November for evaluation of a terrible migraine associated with fevers as high as 103 degrees.  Discussion of lumbar puncture was had however given the patient's lack of meningismus and other nontoxic signs it was decided to forego the LP and she was sent home with symptomatic control.  She says she has a migraine approximately 2-3 times a week and usually it abates with acetaminophen and caffeine.  Unclear what her triggers are however dehydration and increased screen time appear to play a role.  She has never been on prophylactic medications for the migraines and has never seen a headache specialist.  Additionally, she would like if you lesions on her arms evaluated.  They have been present for about a year.  They are nontender and not itchy.  Perhaps 3-4 are present on her bilateral arms.  No other complaints at this time.    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    12-point ROS was reviewed and is negative, unless otherwise noted in HPI    Objective   Vitals:    11/28/23 1305   BP: 112/68   Pulse: 68   SpO2: 98%      GEN: alert, conversant   HEENT: PERRL, EOMI    NECK: supple, no LAD appreciated  CHEST: appropriate respiratory effort  CV: RRR   ABD: soft, nondistended, nontender  EXT: no obvious deformities  SKIN: Distal right forearm the medial aspect there is a raised, poorly circumscribed lesion that is nonfluctuant however not hard to touch, soft and  fleshy, can see hair follicles within the lesion, mild discoloration, nontender to palpation, similar appearing lesion on the left arm however less discolored    Assessment and Plan:  1.  Migraine headaches  Has a triptan at home, migraines usually hans with acetaminophen and caffeine.  - refilled reglan to use PRN headaches/nausea  Counseled on trigger avoidance, hydration, decrease screen time.  She understands that if she has to use the triptan more than a few times a week, she may need prophylactic medication.    2.  Skin lesions  Appear to be sebaceous/subdermal cysts however would prefer dermatologist evaluate them.  No overt risk factors for malignancy.  Father did have melanoma.    Health Maintenance:   Vaccines: Advised updating Tdap, otherwise UTD  Screening: Needs OB/GYN for cervical cancer screening  Labs: None needed    RTC in 12 months, or sooner PRN    This note is not finalized until attending attestation is present.    Jelani Buchanan    Trainee role: Resident    I saw and evaluated the patient. I personally obtained the key and critical portions of the history and physical exam or was physically present for key and critical portions performed by the trainee. I reviewed the trainee's documentation and discussed the patient with the trainee. I agree with the trainee's medical decision making as documented on the trainee's notes.    Graham Guillen,

## 2023-12-07 ENCOUNTER — TELEMEDICINE (OUTPATIENT)
Dept: PRIMARY CARE | Facility: CLINIC | Age: 23
End: 2023-12-07
Payer: COMMERCIAL

## 2023-12-07 DIAGNOSIS — J06.9 VIRAL URI WITH COUGH: Primary | ICD-10-CM

## 2023-12-07 DIAGNOSIS — J02.9 SORE THROAT: ICD-10-CM

## 2023-12-07 PROCEDURE — 99213 OFFICE O/P EST LOW 20 MIN: CPT | Performed by: STUDENT IN AN ORGANIZED HEALTH CARE EDUCATION/TRAINING PROGRAM

## 2023-12-07 RX ORDER — BENZONATATE 100 MG/1
100 CAPSULE ORAL NIGHTLY PRN
Qty: 30 CAPSULE | Refills: 0 | Status: SHIPPED | OUTPATIENT
Start: 2023-12-07 | End: 2024-01-06

## 2023-12-07 NOTE — PROGRESS NOTES
Subjective   Patient ID: April Quiroz is a 23 y.o. female who presents for Sore Throat (States exposed to Covid).  HPI  April presents for a virtual sick visit.    She reports ~3-4 days of sore throat, fatigue, body aches, rhinorrhea, nasal congestion and a cough productive of yellow sputum. 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 She has not been having any fevers or chills. She denies CP, SOB, abdominal pain, nausea, vomiting, or any other complaint at this time.    Review of Systems  12-point ROS was reviewed and is negative, unless otherwise noted in HPI    Objective   There were no vitals filed for this visit.   Physical Exam  GEN: alert, conversant, NAD    Limited due to virtual visit    Assessment/Plan   #acute viral URI with cough/congestion  #sore throat/fatigue  Timecourse, lack of fever, constellation of symptoms point to viral etiology  Advised patient to present to urgent care to obtain COVID, Mono testing. Less likely strep (no fevers, timecourse, etc) but can swab for strep for reassurance  - Given script for tessalon pearles.  - advised to stay well hydrated, resting regularly, continue supportive care measures.  - Advised to call for worsening symptoms in the next 4-5 days, for any fevers/chills, or significant SOB/sputum production     I spent 21 minutes reviewing chart, visiting with patient, writing prescriptions and documenting in the chart.     Graham Guillen, DO

## 2023-12-11 ENCOUNTER — TELEPHONE (OUTPATIENT)
Dept: PRIMARY CARE | Facility: CLINIC | Age: 23
End: 2023-12-11
Payer: COMMERCIAL

## 2023-12-11 DIAGNOSIS — B27.80 OTHER INFECTIOUS MONONUCLEOSIS WITHOUT COMPLICATION: Primary | ICD-10-CM

## 2024-01-25 ENCOUNTER — DOCUMENTATION (OUTPATIENT)
Dept: PRIMARY CARE | Facility: CLINIC | Age: 24
End: 2024-01-25
Payer: COMMERCIAL

## 2024-01-25 NOTE — PROGRESS NOTES
Received mailed alert from  Pathology that patient due for 3 month follow up Pap for ASC-H result. This is likely a reflexive letter, and does not take into account that patient is aged <25 years. This her risk of subsequent abnormal Paps is less than those past the cut off.    She has established care with another physician since this result. I messaged Dr. Tarango and informed her of the contents of the letter, so that she could  the patient on options.

## 2024-02-16 ENCOUNTER — LAB REQUISITION (OUTPATIENT)
Dept: LAB | Facility: HOSPITAL | Age: 24
End: 2024-02-16
Payer: COMMERCIAL

## 2024-02-16 LAB — SARS-COV-2 RNA RESP QL NAA+PROBE: DETECTED

## 2024-02-16 PROCEDURE — 87635 SARS-COV-2 COVID-19 AMP PRB: CPT

## 2024-09-05 ENCOUNTER — APPOINTMENT (OUTPATIENT)
Dept: PRIMARY CARE | Facility: CLINIC | Age: 24
End: 2024-09-05
Payer: COMMERCIAL

## 2024-09-05 VITALS
SYSTOLIC BLOOD PRESSURE: 128 MMHG | HEIGHT: 66 IN | BODY MASS INDEX: 32.95 KG/M2 | DIASTOLIC BLOOD PRESSURE: 76 MMHG | WEIGHT: 205 LBS

## 2024-09-05 DIAGNOSIS — Z00.00 WELL ADULT EXAM: ICD-10-CM

## 2024-09-05 DIAGNOSIS — L98.9 SKIN LESION: ICD-10-CM

## 2024-09-05 DIAGNOSIS — E55.9 VITAMIN D DEFICIENCY: Primary | ICD-10-CM

## 2024-09-05 PROCEDURE — 99395 PREV VISIT EST AGE 18-39: CPT | Performed by: STUDENT IN AN ORGANIZED HEALTH CARE EDUCATION/TRAINING PROGRAM

## 2024-09-05 PROCEDURE — 90471 IMMUNIZATION ADMIN: CPT | Performed by: STUDENT IN AN ORGANIZED HEALTH CARE EDUCATION/TRAINING PROGRAM

## 2024-09-05 PROCEDURE — 3008F BODY MASS INDEX DOCD: CPT | Performed by: STUDENT IN AN ORGANIZED HEALTH CARE EDUCATION/TRAINING PROGRAM

## 2024-09-05 PROCEDURE — 1036F TOBACCO NON-USER: CPT | Performed by: STUDENT IN AN ORGANIZED HEALTH CARE EDUCATION/TRAINING PROGRAM

## 2024-09-05 PROCEDURE — 90715 TDAP VACCINE 7 YRS/> IM: CPT | Performed by: STUDENT IN AN ORGANIZED HEALTH CARE EDUCATION/TRAINING PROGRAM

## 2024-09-05 PROCEDURE — 99214 OFFICE O/P EST MOD 30 MIN: CPT | Performed by: STUDENT IN AN ORGANIZED HEALTH CARE EDUCATION/TRAINING PROGRAM

## 2024-09-05 NOTE — PROGRESS NOTES
Subjective   Patient ID: April Quiroz is a 24 y.o. female who presents for Annual Exam.  HPI  This is a 23-year-old here for yearly physical.    Right side axillary skin lump noted ~1.5 months ago. No correlation to URI symptoms, no recent vaccines. Notes that it has reduced in size since she first noticed, <1cm at this time. Noted one skin lump ~2 week later left axillary, which has reduced in size as well since onset.    Denies fevers, chills, sleeping well at night, no night sweats, no changes to hair/skin, rashes, no significant weight changes.    ROS  12-point ROS was reviewed and is negative, unless otherwise noted in HPI    Objective   Vitals:    09/05/24 1207   BP: 128/76      GEN: conversant, NAD  HEENT: PERRL, EOMI  NECK: supple, full, no carotid bruits appreciated bilaterally  CV: S1, S2, regular, no murmur  PULM: CTAB  ABD: soft, NT, obese  EXT: no LE edema  NEURO: no gross focal deficits  PSYCH: appropriate affect       Assessment and Plan:  #well adult  - Counseled continued efforts on healthy lifestyle modification including balanced diet, and continued exercise for >5 minutes  - counseled age appropriate vaccines and preventative measures    #Migraine headaches  Improved/less frequent  Has a triptan at home, migraines usually hans with acetaminophen and caffeine.  Counseled on trigger avoidance, hydration, decrease screen time.    #Skin lesions  Appear to be sebaceous/subdermal cysts however would prefer dermatologist evaluate them.  No overt risk factors for malignancy.     Health Maintenance:   Vaccines: Tdap (update today)  Screening: Paptest (2023)  Labs:  obtain today    RTC in 12 months, or sooner PRN    Graham Guillen DO

## 2024-09-11 ENCOUNTER — LAB (OUTPATIENT)
Dept: LAB | Facility: LAB | Age: 24
End: 2024-09-11
Payer: COMMERCIAL

## 2024-09-11 DIAGNOSIS — E55.9 VITAMIN D DEFICIENCY: ICD-10-CM

## 2024-09-11 DIAGNOSIS — E55.9 VITAMIN D DEFICIENCY: Primary | ICD-10-CM

## 2024-09-11 DIAGNOSIS — Z00.00 WELL ADULT EXAM: ICD-10-CM

## 2024-09-11 DIAGNOSIS — B27.80 OTHER INFECTIOUS MONONUCLEOSIS WITHOUT COMPLICATION: ICD-10-CM

## 2024-09-11 LAB
25(OH)D3 SERPL-MCNC: 17 NG/ML (ref 30–100)
ALBUMIN SERPL BCP-MCNC: 4.3 G/DL (ref 3.4–5)
ALP SERPL-CCNC: 51 U/L (ref 33–110)
ALT SERPL W P-5'-P-CCNC: 13 U/L (ref 7–45)
ANION GAP SERPL CALC-SCNC: 12 MMOL/L (ref 10–20)
AST SERPL W P-5'-P-CCNC: 15 U/L (ref 9–39)
BILIRUB SERPL-MCNC: 0.6 MG/DL (ref 0–1.2)
BUN SERPL-MCNC: 9 MG/DL (ref 6–23)
CALCIUM SERPL-MCNC: 9.4 MG/DL (ref 8.6–10.6)
CHLORIDE SERPL-SCNC: 105 MMOL/L (ref 98–107)
CHOLEST SERPL-MCNC: 166 MG/DL (ref 0–199)
CHOLESTEROL/HDL RATIO: 2.6
CO2 SERPL-SCNC: 25 MMOL/L (ref 21–32)
CREAT SERPL-MCNC: 0.69 MG/DL (ref 0.5–1.05)
EGFRCR SERPLBLD CKD-EPI 2021: >90 ML/MIN/1.73M*2
ERYTHROCYTE [DISTWIDTH] IN BLOOD BY AUTOMATED COUNT: 12.4 % (ref 11.5–14.5)
GLUCOSE SERPL-MCNC: 94 MG/DL (ref 74–99)
HCT VFR BLD AUTO: 41.3 % (ref 36–46)
HDLC SERPL-MCNC: 63 MG/DL
HGB BLD-MCNC: 13.9 G/DL (ref 12–16)
LDLC SERPL CALC-MCNC: 89 MG/DL
MCH RBC QN AUTO: 29.2 PG (ref 26–34)
MCHC RBC AUTO-ENTMCNC: 33.7 G/DL (ref 32–36)
MCV RBC AUTO: 87 FL (ref 80–100)
NON HDL CHOLESTEROL: 103 MG/DL (ref 0–149)
NRBC BLD-RTO: 0 /100 WBCS (ref 0–0)
PHOSPHATE SERPL-MCNC: 3.5 MG/DL (ref 2.5–4.9)
PLATELET # BLD AUTO: 250 X10*3/UL (ref 150–450)
POTASSIUM SERPL-SCNC: 4.7 MMOL/L (ref 3.5–5.3)
PROT SERPL-MCNC: 7.2 G/DL (ref 6.4–8.2)
RBC # BLD AUTO: 4.76 X10*6/UL (ref 4–5.2)
SODIUM SERPL-SCNC: 137 MMOL/L (ref 136–145)
TRIGL SERPL-MCNC: 70 MG/DL (ref 0–149)
TSH SERPL-ACNC: 3.45 MIU/L (ref 0.44–3.98)
VLDL: 14 MG/DL (ref 0–40)
WBC # BLD AUTO: 5.2 X10*3/UL (ref 4.4–11.3)

## 2024-09-11 PROCEDURE — 36415 COLL VENOUS BLD VENIPUNCTURE: CPT

## 2024-09-11 PROCEDURE — 84443 ASSAY THYROID STIM HORMONE: CPT

## 2024-09-11 PROCEDURE — 80053 COMPREHEN METABOLIC PANEL: CPT

## 2024-09-11 PROCEDURE — 85027 COMPLETE CBC AUTOMATED: CPT

## 2024-09-11 PROCEDURE — RXMED WILLOW AMBULATORY MEDICATION CHARGE

## 2024-09-11 PROCEDURE — 84100 ASSAY OF PHOSPHORUS: CPT

## 2024-09-11 PROCEDURE — 82306 VITAMIN D 25 HYDROXY: CPT

## 2024-09-11 PROCEDURE — 80061 LIPID PANEL: CPT

## 2024-09-11 RX ORDER — ERGOCALCIFEROL 1.25 MG/1
50000 CAPSULE ORAL WEEKLY
Qty: 12 CAPSULE | Refills: 1 | Status: SHIPPED | OUTPATIENT
Start: 2024-09-11 | End: 2025-03-10

## 2024-09-16 ENCOUNTER — PHARMACY VISIT (OUTPATIENT)
Dept: PHARMACY | Facility: CLINIC | Age: 24
End: 2024-09-16
Payer: COMMERCIAL

## 2024-09-17 ENCOUNTER — APPOINTMENT (OUTPATIENT)
Dept: PRIMARY CARE | Facility: CLINIC | Age: 24
End: 2024-09-17
Payer: COMMERCIAL

## 2025-01-15 ENCOUNTER — APPOINTMENT (OUTPATIENT)
Dept: DERMATOLOGY | Facility: CLINIC | Age: 25
End: 2025-01-15
Payer: COMMERCIAL

## 2025-01-15 DIAGNOSIS — L70.0 ACNE VULGARIS: ICD-10-CM

## 2025-01-15 DIAGNOSIS — L73.2 HIDRADENITIS SUPPURATIVA: Primary | ICD-10-CM

## 2025-01-15 DIAGNOSIS — I99.9 VASCULAR ABNORMALITY: ICD-10-CM

## 2025-01-15 PROCEDURE — 99203 OFFICE O/P NEW LOW 30 MIN: CPT | Performed by: NURSE PRACTITIONER

## 2025-01-15 PROCEDURE — 1036F TOBACCO NON-USER: CPT | Performed by: NURSE PRACTITIONER

## 2025-01-15 PROCEDURE — RXMED WILLOW AMBULATORY MEDICATION CHARGE

## 2025-01-15 RX ORDER — BENZOYL PEROXIDE 100 MG/ML
1 LIQUID TOPICAL DAILY
Qty: 237 G | Refills: 6 | Status: SHIPPED | OUTPATIENT
Start: 2025-01-15 | End: 2026-01-15

## 2025-01-15 RX ORDER — CLINDAMYCIN PHOSPHATE 10 UG/ML
LOTION TOPICAL 2 TIMES DAILY
Qty: 60 ML | Refills: 1 | Status: SHIPPED | OUTPATIENT
Start: 2025-01-15

## 2025-01-15 RX ORDER — TRETINOIN 0.5 MG/G
CREAM TOPICAL
Qty: 20 G | Refills: 1 | Status: SHIPPED | OUTPATIENT
Start: 2025-01-15

## 2025-01-15 NOTE — PROGRESS NOTES
Subjective     April Quiroz is a 24 y.o. female who presents for the following: multiple lesions.   New patient in for lesions to right forearm and left upper posterior arm present for years, no prior treatments, patient states at times tender and itchy.     Review of Systems:  No other skin or systemic complaints other than what is documented elsewhere in the note.    The following portions of the chart were reviewed this encounter and updated as appropriate:       Skin Cancer History  No skin cancer on file.    Specialty Problems    None    Past Medical History:  April Quiroz  has no past medical history on file.    Past Surgical History:  April Quiroz  has no past surgical history on file.    Family History:  Patient family history is not on file.    Social History:  April Quiroz  reports that she has never smoked. She has never used smokeless tobacco. She reports that she does not drink alcohol and does not use drugs.    Allergies:  Patient has no known allergies.    Current Medications / CAM's:    Current Outpatient Medications:     benzoyl peroxide (Benzac AC) 10 % external wash, Apply 1 Application topically once daily., Disp: 237 g, Rfl: 6    clindamycin (Cleocin T) 1 % lotion, Apply topically 2 times a day., Disp: 60 mL, Rfl: 1    diphenhydrAMINE (Sominex) 25 mg tablet, Take 1 tablet (25 mg) by mouth every 8 hours if needed for itching or allergies for up to 5 days., Disp: 15 tablet, Rfl: 0    ergocalciferol (Vitamin D-2) 1.25 MG (65832 UT) capsule, Take 1 capsule (50,000 Units) by mouth 1 (one) time per week., Disp: 12 capsule, Rfl: 1    metoclopramide (Reglan) 10 mg tablet, Take 1 tablet (10 mg) by mouth every 6 hours for 5 days., Disp: 20 tablet, Rfl: 0    SUMAtriptan (Imitrex) 25 mg tablet, TAKE 1 TABLET BY MOUTH AT ONSET OF HEADACHE. IF HEADACHE PERSISTS AFTER 2 HOURS MAY REPEAT DOSE. DO NOT TAKE MORE THAN 2 TABLETS IN 24 HOURS, Disp: , Rfl:     tretinoin (Retin-A) 0.05 % cream, Apply a thin layer to  affected area at bedtime as tolerated., Disp: 20 g, Rfl: 1     Objective   Well appearing patient in no apparent distress; mood and affect are within normal limits.      Assessment/Plan   1. Hidradenitis suppurativa  Left Axilla, Pubic, Right Axilla  Dilated comedones, inflammatory papules, sinus tract formation, scarring    -Discussed nature of condition  -Discussed treatment options  -Discussed/information given on the potential adverse effects of rx Doxycycline, including but not limited to, GI upset (nausea, vomiting, diarrhea), photosensitivity (and the need to wear SPF and avoid prolonged outdoor exposure), esophagitis (the patient is to take the medication with food & water and to remain upright for 1 hour after taking medication), and headaches.   -Doxycycline may may be taken with food to avoid GI upset; if taking with milk, multivitamins or antacids, the absorption of Doxycycline may be decreased but this is usually not an issue when treating common dermatologic conditions.  -Recommend the shortest appropriate course of oral antibiotics to reduce the chance of antibiotic resistance among bacteria.  -For patients of child-bearing potential, discussed that patient should not become pregnant while taking this medication as it can cause damage to the infant's teeth and other issues during pregnancy  -Recommend:    Related Medications  benzoyl peroxide (Benzac AC) 10 % external wash  Apply 1 Application topically once daily.    clindamycin (Cleocin T) 1 % lotion  Apply topically 2 times a day.    2. Acne vulgaris  Head - Anterior (Face)  Deep seated, inflammatory papules    -Discussed diagnosis of acne  -Discussed natural history of condition and expectations for treatment  -Recommend:    Follow Up In Dermatology - Head - Anterior (Face)    tretinoin (Retin-A) 0.05 % cream - Head - Anterior (Face)  Apply a thin layer to affected area at bedtime as tolerated.    3. 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 She reports occasional tenderness but denies significant pain or any functional limitations. There is no history of trauma or recent injury to the wrist. No other systemic symptoms such as fever, weight loss, or numbness are reported.  Rope like pink nodular lesion with no palpable pulse over the area of concern. Occasional tenderness noted.  Full range of motion without limitation. No neurological deficits observed. Capillary refill time is within normal limits.    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 Differential diagnoses include arteriovenous malformation (AVM), venous malformation, or hemangioma.    -Recommend observation  -Discuss the benign nature with low potential risks such as growth, thrombosis, or bleeding.  -Instructed the patient to monitor for changes in size, color, or new symptoms (e.g., increased pain, numbness).  -If patient desires vascular consultation for more aggressive treatment options.            oriented to person, place, time and situation

## 2025-01-19 ENCOUNTER — PHARMACY VISIT (OUTPATIENT)
Dept: PHARMACY | Facility: CLINIC | Age: 25
End: 2025-01-19
Payer: MEDICARE

## 2025-04-23 ENCOUNTER — APPOINTMENT (OUTPATIENT)
Dept: DERMATOLOGY | Facility: CLINIC | Age: 25
End: 2025-04-23
Payer: COMMERCIAL

## 2025-04-23 DIAGNOSIS — L70.0 ACNE VULGARIS: ICD-10-CM

## 2025-04-23 DIAGNOSIS — L85.8 KERATOSIS PILARIS: ICD-10-CM

## 2025-04-23 DIAGNOSIS — L73.2 HIDRADENITIS SUPPURATIVA: Primary | ICD-10-CM

## 2025-04-23 PROCEDURE — RXMED WILLOW AMBULATORY MEDICATION CHARGE

## 2025-04-23 PROCEDURE — 99214 OFFICE O/P EST MOD 30 MIN: CPT | Performed by: NURSE PRACTITIONER

## 2025-04-23 PROCEDURE — 1036F TOBACCO NON-USER: CPT | Performed by: NURSE PRACTITIONER

## 2025-04-23 RX ORDER — AMMONIUM LACTATE 12 G/100G
CREAM TOPICAL AS NEEDED
Qty: 140 G | Refills: 11 | Status: SHIPPED | OUTPATIENT
Start: 2025-04-23 | End: 2026-04-23

## 2025-04-23 RX ORDER — TRETINOIN 0.25 MG/G
CREAM TOPICAL NIGHTLY
Qty: 20 G | Refills: 1 | Status: SHIPPED | OUTPATIENT
Start: 2025-04-23 | End: 2026-04-23

## 2025-04-23 NOTE — Clinical Note
-Discussed nature of condition  -Discussed treatment options  -Discussed/information given on the potential adverse effects of rx Doxycycline, including but not limited to, GI upset (nausea, vomiting, diarrhea), photosensitivity (and the need to wear SPF and avoid prolonged outdoor exposure), esophagitis (the patient is to take the medication with food & water and to remain upright for 1 hour after taking medication), and headaches.   -Doxycycline may may be taken with food to avoid GI upset; if taking with milk, multivitamins or antacids, the absorption of Doxycycline may be decreased but this is usually not an issue when treating common dermatologic conditions.  -Recommend the shortest appropriate course of oral antibiotics to reduce the chance of antibiotic resistance among bacteria.  -For patients of child-bearing potential, discussed that patient should not become pregnant while taking this medication as it can cause damage to the infant's teeth and other issues during pregnancy  -Recommend:

## 2025-04-23 NOTE — Clinical Note
Improved with only occasional flaring.  She is happy with this treatment and admits to being somewhat non compliant.

## 2025-04-23 NOTE — Clinical Note
-Discussed the nature of the condition  -Discussed this is considered a variant of normal skin as this condition is incredibly common and is also very difficult to treat/improve for many people  -Recommend rx Ammonium Lactate 12% lotion, apply to the affected areas of rough skin twice daily. It will take at least 2 months to assess if this will be helpful.  -Over the counter options include:  CeraVe with salicylic acid (CeraVeSA for Rough & Bumpy Skin) or AmLactin.

## 2025-04-23 NOTE — PROGRESS NOTES
Subjective     April Quiroz is a 25 y.o. female who presents for the following: Hidradenitis Suppurativa and Acne.   Established patient in for follow up last seen 01/2025   Hidradenitis suppurativa  Left Axilla, Pubic, Right Axilla  benzoyl peroxide (Benzac AC) 10 % external wash  Apply 1 Application topically once daily.  clindamycin (Cleocin T) 1 % lotion  Apply topically 2 times a day.  Acne vulgaris  Head - Anterior (Face)  tretinoin (Retin-A) 0.05 % cream - Head - Anterior (Face)  Apply a thin layer to affected area at bedtime as tolerated.     Review of Systems:  No other skin or systemic complaints other than what is documented elsewhere in the note.    The following portions of the chart were reviewed this encounter and updated as appropriate:       Skin Cancer History  Biopsy Log Book  No skin cancers from Specimen Tracking.    Additional History      Specialty Problems    None    Past Medical History:  April Quiroz  has no past medical history on file.    Past Surgical History:  April Quiroz  has no past surgical history on file.    Family History:  Patient family history is not on file.    Social History:  April Quiroz  reports that she has never smoked. She has never used smokeless tobacco. She reports that she does not drink alcohol and does not use drugs.    Allergies:  Patient has no known allergies.    Current Medications / CAM's:  Current Medications[1]     Objective   Well appearing patient in no apparent distress; mood and affect are within normal limits.      Assessment/Plan   Skin Exam  1. HIDRADENITIS SUPPURATIVA  Left Axilla, Pubic, Right Axilla  Improved with only occasional flaring.  She is happy with this treatment and admits to being somewhat non compliant.   -Discussed nature of condition  -Discussed treatment options  -Discussed/information given on the potential adverse effects of rx Doxycycline, including but not limited to, GI upset (nausea, vomiting, diarrhea), photosensitivity (and  the need to wear SPF and avoid prolonged outdoor exposure), esophagitis (the patient is to take the medication with food & water and to remain upright for 1 hour after taking medication), and headaches.   -Doxycycline may may be taken with food to avoid GI upset; if taking with milk, multivitamins or antacids, the absorption of Doxycycline may be decreased but this is usually not an issue when treating common dermatologic conditions.  -Recommend the shortest appropriate course of oral antibiotics to reduce the chance of antibiotic resistance among bacteria.  -For patients of child-bearing potential, discussed that patient should not become pregnant while taking this medication as it can cause damage to the infant's teeth and other issues during pregnancy  -Recommend:  Related Medications  benzoyl peroxide (Benzac AC) 10 % external wash  Apply 1 Application topically once daily.  clindamycin (Cleocin T) 1 % lotion  Apply topically 2 times a day.  2. ACNE VULGARIS  Head - Anterior (Face)  Deep seated, inflammatory papules  -Discussed diagnosis of acne  -Discussed natural history of condition and expectations for treatment  -Recommend:  tretinoin (Retin-A) 0.025 % cream - Head - Anterior (Face)  Apply topically once daily at bedtime.  Related Procedures  Follow Up In Dermatology  3. KERATOSIS PILARIS (2)  Left Upper Arm - Posterior, Right Upper Arm - Posterior  Minute keratotic papules with follicular-based spines  -Discussed the nature of the condition  -Discussed this is considered a variant of normal skin as this condition is incredibly common and is also very difficult to treat/improve for many people  -Recommend rx Ammonium Lactate 12% lotion, apply to the affected areas of rough skin twice daily. It will take at least 2 months to assess if this will be helpful.  -Over the counter options include:  CeraVe with salicylic acid (CeraVeSA for Rough & Bumpy Skin) or AmLactin.             [1]   Current Outpatient  Medications:     benzoyl peroxide (Benzac AC) 10 % external wash, Apply 1 Application topically once daily., Disp: 237 g, Rfl: 6    clindamycin (Cleocin T) 1 % lotion, Apply topically 2 times a day., Disp: 60 mL, Rfl: 1    diphenhydrAMINE (Sominex) 25 mg tablet, Take 1 tablet (25 mg) by mouth every 8 hours if needed for itching or allergies for up to 5 days., Disp: 15 tablet, Rfl: 0    metoclopramide (Reglan) 10 mg tablet, Take 1 tablet (10 mg) by mouth every 6 hours for 5 days., Disp: 20 tablet, Rfl: 0    SUMAtriptan (Imitrex) 25 mg tablet, TAKE 1 TABLET BY MOUTH AT ONSET OF HEADACHE. IF HEADACHE PERSISTS AFTER 2 HOURS MAY REPEAT DOSE. DO NOT TAKE MORE THAN 2 TABLETS IN 24 HOURS, Disp: , Rfl:     tretinoin (Retin-A) 0.025 % cream, Apply topically once daily at bedtime., Disp: 20 g, Rfl: 1

## 2025-04-23 NOTE — Clinical Note
-Discussed diagnosis of acne  -Discussed natural history of condition and expectations for treatment  -Recommend:

## 2025-04-30 ENCOUNTER — PHARMACY VISIT (OUTPATIENT)
Dept: PHARMACY | Facility: CLINIC | Age: 25
End: 2025-04-30
Payer: MEDICARE

## 2025-05-23 ENCOUNTER — E-VISIT (OUTPATIENT)
Dept: PRIMARY CARE | Facility: CLINIC | Age: 25
End: 2025-05-23
Payer: COMMERCIAL

## 2025-05-23 DIAGNOSIS — R30.0 DYSURIA: Primary | ICD-10-CM

## 2025-05-23 NOTE — TELEPHONE ENCOUNTER
Concern for UTI:  No N,V,D  No back pain  No CVA TTP  No abdominal pain  No pelvic pain  No blood in urine  No fever  No kidney or bladder infections in last 12 months  No prior kidney stones or surgery  No STI concerns  No genital sores  Not pregnant    +frequency  - urgency  +dysuria    I have no known allergies, no medications on a daily basis. My symptoms started on Monday 5/19, but didn’t start to bother me until Wednesday/Thursday. I do have seasonal allergies and have had them on and off for the last few weeks or so, no runny nose/congestion for the last few days. Last period was around the end of April, and no chance of pregnancy.     Got it!  Since you have never had a urinary tract infection before, we need to get a urine sample for analysis.  You can go to any  [facility] lab and they will give you a cup to give a urine sample. Let me know when you have dropped it off and we can get started on an antibiotic while we wait for the results.  Dysuria  Check urine culture  Yes

## 2025-05-25 LAB
APPEARANCE UR: CLEAR
BACTERIA UR CULT: NORMAL
BILIRUB UR QL STRIP: NEGATIVE
COLOR UR: YELLOW
GLUCOSE UR QL STRIP: NEGATIVE
HGB UR QL STRIP: NEGATIVE
KETONES UR QL STRIP: NEGATIVE
LEUKOCYTE ESTERASE UR QL STRIP: NEGATIVE
NITRITE UR QL STRIP: NEGATIVE
PH UR STRIP: 6 [PH] (ref 5–8)
PROT UR QL STRIP: NEGATIVE
SP GR UR STRIP: 1 (ref 1–1.03)